# Patient Record
Sex: FEMALE
[De-identification: names, ages, dates, MRNs, and addresses within clinical notes are randomized per-mention and may not be internally consistent; named-entity substitution may affect disease eponyms.]

---

## 2017-07-06 ENCOUNTER — FORM ENCOUNTER (OUTPATIENT)
Age: 47
End: 2017-07-06

## 2018-11-18 ENCOUNTER — FORM ENCOUNTER (OUTPATIENT)
Age: 48
End: 2018-11-18

## 2019-12-22 ENCOUNTER — FORM ENCOUNTER (OUTPATIENT)
Age: 49
End: 2019-12-22

## 2020-01-27 ENCOUNTER — FORM ENCOUNTER (OUTPATIENT)
Age: 50
End: 2020-01-27

## 2020-03-12 ENCOUNTER — TRANSCRIPTION ENCOUNTER (OUTPATIENT)
Age: 50
End: 2020-03-12

## 2021-01-30 DIAGNOSIS — Z80.1 FAMILY HISTORY OF MALIGNANT NEOPLASM OF TRACHEA, BRONCHUS AND LUNG: ICD-10-CM

## 2021-01-30 DIAGNOSIS — Z80.9 FAMILY HISTORY OF MALIGNANT NEOPLASM, UNSPECIFIED: ICD-10-CM

## 2021-01-30 DIAGNOSIS — Q04.9 CONGENITAL MALFORMATION OF BRAIN, UNSPECIFIED: ICD-10-CM

## 2021-01-30 DIAGNOSIS — Z80.0 FAMILY HISTORY OF MALIGNANT NEOPLASM OF DIGESTIVE ORGANS: ICD-10-CM

## 2021-02-04 ENCOUNTER — APPOINTMENT (OUTPATIENT)
Dept: BREAST CENTER | Facility: CLINIC | Age: 51
End: 2021-02-04

## 2021-03-30 PROBLEM — Z00.00 ENCOUNTER FOR PREVENTIVE HEALTH EXAMINATION: Status: ACTIVE | Noted: 2021-01-25

## 2021-03-31 ENCOUNTER — APPOINTMENT (OUTPATIENT)
Dept: BREAST CENTER | Facility: CLINIC | Age: 51
End: 2021-03-31
Payer: COMMERCIAL

## 2021-03-31 VITALS
DIASTOLIC BLOOD PRESSURE: 72 MMHG | BODY MASS INDEX: 20.8 KG/M2 | HEIGHT: 62 IN | WEIGHT: 113 LBS | HEART RATE: 100 BPM | SYSTOLIC BLOOD PRESSURE: 117 MMHG

## 2021-03-31 DIAGNOSIS — Z00.00 ENCOUNTER FOR GENERAL ADULT MEDICAL EXAMINATION W/OUT ABNORMAL FINDINGS: ICD-10-CM

## 2021-03-31 PROCEDURE — 99213 OFFICE O/P EST LOW 20 MIN: CPT

## 2021-03-31 PROCEDURE — 99072 ADDL SUPL MATRL&STAF TM PHE: CPT

## 2021-03-31 NOTE — PHYSICAL EXAM
[de-identified] : Bilateral Breast/Axilla/Supraclavicular Area: no masses, discharge, or adenopathy

## 2021-03-31 NOTE — REVIEW OF SYSTEMS
[Negative] : Constitutional [Shortness Of Breath] : no shortness of breath [Wheezing] : no wheezing [Skin Lesions] : no skin lesions [Skin Wound] : no skin wound

## 2021-03-31 NOTE — HISTORY OF PRESENT ILLNESS
[FreeTextEntry1] : 3/31/21: Patient with significant family history, here for breast cancer screening, She denies palpable breast mass or nipple discharge. Patient had genetic testing in January 2020 and was found to be BRCA (-) with a VUS in BMPR1A.\par  7/7/2017: bilateral mammogram-VIRGIL\par  11/19/2018: bilateral mammogram and ultrasound-multiple cysts.\par  12/23/19 bear mg and us: no evidence of disease. \par 1/8/21: Bear MG & US: heterogeneously dense, scattered benign calcs bear, bear nodular asymmetry c/w cysts seen on US, R - stable tissue marker, US: R - 0.6cm benign cluster of cysts 3:00 1FN, 0.7cm cluster of cysts 4:00 1FN, 0.9cm cyst w/ debris 6:001 FN, L - 0.7cm cyst w/ debris 12:00 3FN, 0.4cm cyst w/ debris 10:00 3FN, 0.8cm cluster of cysts RA. BIRADS 2. \par \par KEREN Welch Lifetime Risk Score 22.6%

## 2021-03-31 NOTE — PAST MEDICAL HISTORY
[Menarche Age ____] : age at menarche was [unfilled] [Total Preg ___] : G[unfilled] [Live Births ___] : P[unfilled]  [Age At Live Birth ___] : Age at live birth: [unfilled] [Definite ___ (Date)] : the last menstrual period was [unfilled] [Regular Cycle Intervals] : have been regular [History of Hormone Replacement Treatment] : has no history of hormone replacement treatment [FreeTextEntry5] : polyp removal

## 2021-07-22 ENCOUNTER — APPOINTMENT (OUTPATIENT)
Dept: BREAST CENTER | Facility: CLINIC | Age: 51
End: 2021-07-22

## 2021-07-29 ENCOUNTER — APPOINTMENT (OUTPATIENT)
Dept: BREAST CENTER | Facility: CLINIC | Age: 51
End: 2021-07-29
Payer: COMMERCIAL

## 2021-07-29 VITALS
BODY MASS INDEX: 20.8 KG/M2 | WEIGHT: 113 LBS | HEART RATE: 116 BPM | HEIGHT: 62 IN | SYSTOLIC BLOOD PRESSURE: 116 MMHG | DIASTOLIC BLOOD PRESSURE: 82 MMHG

## 2021-07-29 PROCEDURE — 99213 OFFICE O/P EST LOW 20 MIN: CPT

## 2022-02-17 ENCOUNTER — APPOINTMENT (OUTPATIENT)
Dept: BREAST CENTER | Facility: CLINIC | Age: 52
End: 2022-02-17
Payer: COMMERCIAL

## 2022-02-17 ENCOUNTER — NON-APPOINTMENT (OUTPATIENT)
Age: 52
End: 2022-02-17

## 2022-02-17 VITALS
BODY MASS INDEX: 20.98 KG/M2 | WEIGHT: 114 LBS | HEART RATE: 88 BPM | HEIGHT: 62 IN | SYSTOLIC BLOOD PRESSURE: 128 MMHG | DIASTOLIC BLOOD PRESSURE: 84 MMHG

## 2022-02-17 PROCEDURE — 99213 OFFICE O/P EST LOW 20 MIN: CPT

## 2022-09-07 PROBLEM — N64.9 DISORDER OF BREAST, UNSPECIFIED: Status: ACTIVE | Noted: 2021-03-30

## 2022-09-07 PROBLEM — Z12.39 BREAST CANCER SCREENING: Status: ACTIVE | Noted: 2021-02-03

## 2022-09-07 PROBLEM — N60.09 CYST OF BREAST, UNSPECIFIED LATERALITY: Status: ACTIVE | Noted: 2021-03-30

## 2022-09-07 PROBLEM — Z13.71 BRCA NEGATIVE: Status: ACTIVE | Noted: 2021-03-31

## 2022-09-12 ENCOUNTER — APPOINTMENT (OUTPATIENT)
Dept: BREAST CENTER | Facility: CLINIC | Age: 52
End: 2022-09-12

## 2022-09-12 VITALS
DIASTOLIC BLOOD PRESSURE: 71 MMHG | WEIGHT: 122.4 LBS | HEART RATE: 93 BPM | SYSTOLIC BLOOD PRESSURE: 115 MMHG | HEIGHT: 62 IN | BODY MASS INDEX: 22.52 KG/M2

## 2022-09-12 DIAGNOSIS — Z12.39 ENCOUNTER FOR OTHER SCREENING FOR MALIGNANT NEOPLASM OF BREAST: ICD-10-CM

## 2022-09-12 DIAGNOSIS — N60.09 SOLITARY CYST OF UNSPECIFIED BREAST: ICD-10-CM

## 2022-09-12 DIAGNOSIS — Z13.71 ENCOUNTER FOR NONPROCREATIVE SCREENING FOR GENETIC DISEASE CARRIER STATUS: ICD-10-CM

## 2022-09-12 DIAGNOSIS — N64.9 DISORDER OF BREAST, UNSPECIFIED: ICD-10-CM

## 2022-09-12 PROCEDURE — 99213 OFFICE O/P EST LOW 20 MIN: CPT

## 2023-03-10 ENCOUNTER — APPOINTMENT (OUTPATIENT)
Dept: BREAST CENTER | Facility: CLINIC | Age: 53
End: 2023-03-10
Payer: COMMERCIAL

## 2023-03-10 VITALS
DIASTOLIC BLOOD PRESSURE: 78 MMHG | HEART RATE: 79 BPM | BODY MASS INDEX: 21.53 KG/M2 | HEIGHT: 62 IN | WEIGHT: 117 LBS | SYSTOLIC BLOOD PRESSURE: 112 MMHG

## 2023-03-10 DIAGNOSIS — Z78.9 OTHER SPECIFIED HEALTH STATUS: ICD-10-CM

## 2023-03-10 PROCEDURE — 99213 OFFICE O/P EST LOW 20 MIN: CPT

## 2023-03-10 NOTE — HISTORY OF PRESENT ILLNESS
[FreeTextEntry1] : Patient is a 53yo F who presents for breast cancer screening. Followed for fhx of breast cancer in maternal aunt (age 35) and paternal cousin (age 50s) Patient is BRCA negative with a VUS in BMPR1A (tested 2020). Patient denies palpable masses, skin changes, or nipple discharge bilaterally.\par \par KEREN Lifetime Risk- 22.6%\par \par 7/7/17: B/l MG- VIRGIL\par 11/19/18: B/l MG & US- multiple cysts.\par 12/23/19: B/l MG & US- no evidence of disease. \par 1/8/21: B/l MG/US- heterogeneously dense,b/l  benign calcs, tobi nodular asymmetry c/w cysts BIRADS 2. \par 7/22/21: MRI- multiple b/l scattered less than 0.5 cm foci of enhancement, multiple b/l nonenhancing masses likely cysts, BIRADS 2\par 2/17/22: B/l MG & US- heterogenously dense. B/l cysts and diffuse calcifications. VIRGIL. BI-RADS 2\par 9/12/22: MRI- stable scattered enhancing foci b/l, VIRGIL. BIRADS 2.\par 3/10/23: B/l MG & US- heterogenously dense. R bx clip. US- B/l numerous scattered cysts. BI-RADS 2

## 2023-03-10 NOTE — PAST MEDICAL HISTORY
[Menarche Age ____] : age at menarche was [unfilled] [Regular Cycle Intervals] : have been regular [Total Preg ___] : G[unfilled] [Live Births ___] : P[unfilled]  [Age At Live Birth ___] : Age at live birth: [unfilled] [Menstruating] : The patient is menstruating [Definite ___ (Date)] : the last menstrual period was [unfilled] [History of Hormone Replacement Treatment] : has no history of hormone replacement treatment [FreeTextEntry5] : polyp removal  [FreeTextEntry6] : No [FreeTextEntry7] : Yes [FreeTextEntry8] : Yes

## 2023-03-10 NOTE — PHYSICAL EXAM
[Normocephalic] : normocephalic [EOMI] : extra ocular movement intact [Supple] : supple [No Supraclavicular Adenopathy] : no supraclavicular adenopathy [No Cervical Adenopathy] : no cervical adenopathy [de-identified] : Bilateral Breast/Axilla/Supraclavicular Area: no masses, discharge, or adenopathy

## 2023-09-06 NOTE — PAST MEDICAL HISTORY
[History of Hormone Replacement Treatment] : has no history of hormone replacement treatment [FreeTextEntry5] : polyp removal  [FreeTextEntry6] : No [FreeTextEntry7] : Yes [FreeTextEntry8] : Yes

## 2023-09-06 NOTE — PHYSICAL EXAM
[de-identified] : Bilateral Breast/Axilla/Supraclavicular Area: no masses, discharge, or adenopathy

## 2023-09-06 NOTE — HISTORY OF PRESENT ILLNESS
[FreeTextEntry1] : Patient is a 51 yo F who presents for breast cancer screening. Followed for fhx of breast cancer in maternal aunt (age 35) and paternal cousin (age 50s) Patient is BRCA negative with a VUS in BMPR1A (tested 2020). Patient denies palpable masses, skin changes, or nipple discharge bilaterally.  KEREN Lifetime Risk- 22.6%  7/7/17: B/l MG- VIRGIL 11/19/18: B/l MG & US- multiple cysts. 12/23/19: B/l MG & US- no evidence of disease. 1/8/21: B/l MG/US- heterogeneously dense,b/l  benign calcs, tobi nodular asymmetry c/w cysts BIRADS 2.  7/22/21: MRI- multiple b/l scattered less than 0.5 cm foci of enhancement, multiple b/l nonenhancing masses likely cysts, BIRADS 2 2/17/22: B/l MG & US- heterogenously dense. B/l cysts and diffuse calcifications. VIRGIL. BI-RADS 2 9/12/22: MRI- stable scattered enhancing foci b/l, VIRGIL. BIRADS 2. 3/10/23: B/l MG & US- heterogenously dense. R bx clip. US- B/l numerous scattered cysts. BI-RADS 2 9/13/23: MRI- scheduled

## 2023-09-13 ENCOUNTER — NON-APPOINTMENT (OUTPATIENT)
Age: 53
End: 2023-09-13

## 2023-09-13 ENCOUNTER — APPOINTMENT (OUTPATIENT)
Dept: BREAST CENTER | Facility: CLINIC | Age: 53
End: 2023-09-13
Payer: COMMERCIAL

## 2023-09-13 VITALS
WEIGHT: 116 LBS | HEIGHT: 62 IN | SYSTOLIC BLOOD PRESSURE: 122 MMHG | HEART RATE: 73 BPM | BODY MASS INDEX: 21.35 KG/M2 | DIASTOLIC BLOOD PRESSURE: 83 MMHG

## 2023-09-13 DIAGNOSIS — Z12.39 ENCOUNTER FOR OTHER SCREENING FOR MALIGNANT NEOPLASM OF BREAST: ICD-10-CM

## 2023-09-13 DIAGNOSIS — Z80.3 FAMILY HISTORY OF MALIGNANT NEOPLASM OF BREAST: ICD-10-CM

## 2023-09-13 PROCEDURE — 99213 OFFICE O/P EST LOW 20 MIN: CPT

## 2024-03-15 ENCOUNTER — APPOINTMENT (OUTPATIENT)
Dept: BREAST CENTER | Facility: CLINIC | Age: 54
End: 2024-03-15

## 2024-04-11 ENCOUNTER — NON-APPOINTMENT (OUTPATIENT)
Age: 54
End: 2024-04-11

## 2024-04-19 ENCOUNTER — APPOINTMENT (OUTPATIENT)
Dept: BREAST CENTER | Facility: CLINIC | Age: 54
End: 2024-04-19

## 2024-09-23 ENCOUNTER — APPOINTMENT (OUTPATIENT)
Dept: BREAST CENTER | Facility: CLINIC | Age: 54
End: 2024-09-23
Payer: COMMERCIAL

## 2024-09-23 VITALS
HEART RATE: 89 BPM | SYSTOLIC BLOOD PRESSURE: 121 MMHG | DIASTOLIC BLOOD PRESSURE: 77 MMHG | BODY MASS INDEX: 21.9 KG/M2 | HEIGHT: 62 IN | WEIGHT: 119 LBS

## 2024-09-23 DIAGNOSIS — Z12.39 ENCOUNTER FOR OTHER SCREENING FOR MALIGNANT NEOPLASM OF BREAST: ICD-10-CM

## 2024-09-23 PROCEDURE — 99214 OFFICE O/P EST MOD 30 MIN: CPT

## 2024-09-23 RX ORDER — PROGESTERONE 200 MG/1
CAPSULE ORAL
Refills: 0 | Status: ACTIVE | COMMUNITY

## 2024-09-23 RX ORDER — ESTRADIOL 0.06 MG/D
PATCH TRANSDERMAL
Refills: 0 | Status: ACTIVE | COMMUNITY

## 2024-09-23 NOTE — PLAN
[TextEntry] :  Reviewed imaging findings and discussed results with patient.  Discussed the risks and benefits of exogenous HRT use. Discussed the known associated increased risk of developing breast cancer with long term use. Patient is to follow up with GYN regarding post menopausal symptoms and alternatives for symptomatic relief. She is to return in 6 months for annual B/l MG & US.  If benign, she is to return in 1 year for annual high risk MRI and office visit.

## 2024-09-23 NOTE — PAST MEDICAL HISTORY
[Menstruating] : The patient is menstruating [Menarche Age ____] : age at menarche was [unfilled] [Definite ___ (Date)] : the last menstrual period was [unfilled] [Regular Cycle Intervals] : have been regular [Total Preg ___] : G[unfilled] [Live Births ___] : P[unfilled]  [Age At Live Birth ___] : Age at live birth: [unfilled] [History of Hormone Replacement Treatment] : has no history of hormone replacement treatment [FreeTextEntry5] : polyp removal  [FreeTextEntry6] : No [FreeTextEntry7] : Yes [FreeTextEntry8] : Yes

## 2024-09-23 NOTE — PHYSICAL EXAM
[Normocephalic] : normocephalic [EOMI] : extra ocular movement intact [Supple] : supple [No Supraclavicular Adenopathy] : no supraclavicular adenopathy [No Cervical Adenopathy] : no cervical adenopathy [de-identified] : Bilateral Breast/Axilla/Supraclavicular Area: no masses, discharge, or adenopathy

## 2024-09-23 NOTE — PHYSICAL EXAM
[Normocephalic] : normocephalic [EOMI] : extra ocular movement intact [Supple] : supple [No Supraclavicular Adenopathy] : no supraclavicular adenopathy [No Cervical Adenopathy] : no cervical adenopathy [de-identified] : Bilateral Breast/Axilla/Supraclavicular Area: no masses, discharge, or adenopathy

## 2024-09-23 NOTE — HISTORY OF PRESENT ILLNESS
[FreeTextEntry1] : Patient is a 52 yo F who presents for breast cancer screening.  Of note, patient is currently taking progesterone pill and using estradiol patch since 12/23. Followed for fhx of breast cancer in maternal aunt (age 35) and paternal cousin (age 50s) Patient is BRCA negative with a VUS in BMPR1A (tested 2020). Patient denies palpable masses, skin changes, or nipple discharge bilaterally.  KEREN Lifetime Risk- 22.6%  7/7/17: B/l MG- VIRGIL 11/19/18: B/l MG & US- multiple cysts. 12/23/19: B/l MG & US- no evidence of disease. 1/8/21: B/l MG/US- heterogeneously dense,b/l  benign calcs, tobi nodular asymmetry c/w cysts BIRADS 2.  7/22/21: MRI- multiple b/l scattered less than 0.5 cm foci of enhancement, multiple b/l nonenhancing masses likely cysts, BIRADS 2 2/17/22: B/l MG & US- heterogenously dense. B/l cysts and diffuse calcifications. VIRGIL. BI-RADS 2 9/12/22: MRI- stable scattered enhancing foci b/l, VIRGIL. BIRADS 2. 3/10/23: B/l MG & US- heterogenously dense. R bx clip. US- B/l numerous scattered cysts. BI-RADS 2 9/13/23: MRI- No MR evidence of malignancy. BI-RADS 2 3/15/24 B/L MG&US- heterogeneously dense. us-  There are multiple bilateral simple cysts, clusters of cysts, and complicated cysts. B/L retroareolar ductal ectasia. BIRADS 2 9/23/24 MRI- heterogeneously dense. B/L cysts. BIRADS 2.

## 2024-09-23 NOTE — HISTORY OF PRESENT ILLNESS
[FreeTextEntry1] : Patient is a 54 yo F who presents for breast cancer screening.  Of note, patient is currently taking progesterone pill and using estradiol patch since 12/23. Followed for fhx of breast cancer in maternal aunt (age 35) and paternal cousin (age 50s) Patient is BRCA negative with a VUS in BMPR1A (tested 2020). Patient denies palpable masses, skin changes, or nipple discharge bilaterally.  KEREN Lifetime Risk- 22.6%  7/7/17: B/l MG- VIRGIL 11/19/18: B/l MG & US- multiple cysts. 12/23/19: B/l MG & US- no evidence of disease. 1/8/21: B/l MG/US- heterogeneously dense,b/l  benign calcs, tobi nodular asymmetry c/w cysts BIRADS 2.  7/22/21: MRI- multiple b/l scattered less than 0.5 cm foci of enhancement, multiple b/l nonenhancing masses likely cysts, BIRADS 2 2/17/22: B/l MG & US- heterogenously dense. B/l cysts and diffuse calcifications. VIRGIL. BI-RADS 2 9/12/22: MRI- stable scattered enhancing foci b/l, VIRGIL. BIRADS 2. 3/10/23: B/l MG & US- heterogenously dense. R bx clip. US- B/l numerous scattered cysts. BI-RADS 2 9/13/23: MRI- No MR evidence of malignancy. BI-RADS 2 3/15/24 B/L MG&US- heterogeneously dense. us-  There are multiple bilateral simple cysts, clusters of cysts, and complicated cysts. B/L retroareolar ductal ectasia. BIRADS 2 9/23/24 MRI- heterogeneously dense. B/L cysts. BIRADS 2.

## 2025-04-01 ENCOUNTER — NON-APPOINTMENT (OUTPATIENT)
Age: 55
End: 2025-04-01

## 2025-04-01 DIAGNOSIS — R92.8 OTHER ABNORMAL AND INCONCLUSIVE FINDINGS ON DIAGNOSTIC IMAGING OF BREAST: ICD-10-CM

## 2025-05-12 ENCOUNTER — RESULT REVIEW (OUTPATIENT)
Age: 55
End: 2025-05-12

## 2025-05-21 ENCOUNTER — NON-APPOINTMENT (OUTPATIENT)
Age: 55
End: 2025-05-21

## 2025-05-21 ENCOUNTER — APPOINTMENT (OUTPATIENT)
Dept: BREAST CENTER | Facility: CLINIC | Age: 55
End: 2025-05-21
Payer: COMMERCIAL

## 2025-05-21 VITALS
BODY MASS INDEX: 21 KG/M2 | WEIGHT: 120 LBS | SYSTOLIC BLOOD PRESSURE: 113 MMHG | DIASTOLIC BLOOD PRESSURE: 65 MMHG | HEIGHT: 63.43 IN | HEART RATE: 106 BPM

## 2025-05-21 DIAGNOSIS — N60.99 UNSPECIFIED BENIGN MAMMARY DYSPLASIA OF UNSPECIFIED BREAST: ICD-10-CM

## 2025-05-21 PROCEDURE — 99215 OFFICE O/P EST HI 40 MIN: CPT

## 2025-05-21 RX ORDER — MELATONIN 1.5 MG
1.5 TABLET ORAL
Refills: 0 | Status: ACTIVE | COMMUNITY

## 2025-06-10 ENCOUNTER — APPOINTMENT (OUTPATIENT)
Dept: BREAST CENTER | Facility: AMBULATORY SURGERY CENTER | Age: 55
End: 2025-06-10
Payer: COMMERCIAL

## 2025-06-10 ENCOUNTER — RESULT REVIEW (OUTPATIENT)
Age: 55
End: 2025-06-10

## 2025-06-10 PROCEDURE — 76098 X-RAY EXAM SURGICAL SPECIMEN: CPT | Mod: 26

## 2025-06-10 PROCEDURE — 19125 EXCISION BREAST LESION: CPT | Mod: LT

## 2025-06-10 PROCEDURE — 14000 TIS TRNFR TRUNK 10 SQ CM/<: CPT | Mod: LT

## 2025-06-19 ENCOUNTER — APPOINTMENT (OUTPATIENT)
Dept: HEMATOLOGY ONCOLOGY | Facility: CLINIC | Age: 55
End: 2025-06-19
Payer: COMMERCIAL

## 2025-06-19 ENCOUNTER — NON-APPOINTMENT (OUTPATIENT)
Age: 55
End: 2025-06-19

## 2025-06-19 ENCOUNTER — APPOINTMENT (OUTPATIENT)
Dept: BREAST CENTER | Facility: CLINIC | Age: 55
End: 2025-06-19
Payer: COMMERCIAL

## 2025-06-19 VITALS
HEART RATE: 104 BPM | DIASTOLIC BLOOD PRESSURE: 75 MMHG | HEIGHT: 63 IN | SYSTOLIC BLOOD PRESSURE: 127 MMHG | WEIGHT: 117 LBS | BODY MASS INDEX: 20.73 KG/M2

## 2025-06-19 PROBLEM — C50.919: Status: ACTIVE | Noted: 2025-06-19

## 2025-06-19 PROCEDURE — 99024 POSTOP FOLLOW-UP VISIT: CPT

## 2025-06-19 PROCEDURE — 99204 OFFICE O/P NEW MOD 45 MIN: CPT

## 2025-06-19 RX ORDER — ELECTROLYTES/DEXTROSE
SOLUTION, ORAL ORAL
Refills: 0 | Status: ACTIVE | COMMUNITY

## 2025-06-30 ENCOUNTER — NON-APPOINTMENT (OUTPATIENT)
Age: 55
End: 2025-06-30

## 2025-07-03 ENCOUNTER — TRANSCRIPTION ENCOUNTER (OUTPATIENT)
Age: 55
End: 2025-07-03

## 2025-07-07 ENCOUNTER — APPOINTMENT (OUTPATIENT)
Dept: RADIATION ONCOLOGY | Facility: CLINIC | Age: 55
End: 2025-07-07

## 2025-07-07 VITALS
SYSTOLIC BLOOD PRESSURE: 119 MMHG | BODY MASS INDEX: 21.34 KG/M2 | DIASTOLIC BLOOD PRESSURE: 83 MMHG | TEMPERATURE: 97.9 F | WEIGHT: 120.44 LBS | HEART RATE: 94 BPM | RESPIRATION RATE: 16 BRPM | HEIGHT: 63 IN

## 2025-07-07 PROBLEM — Z87.898 HISTORY OF TACHYCARDIA: Status: RESOLVED | Noted: 2025-07-07 | Resolved: 2025-07-07

## 2025-07-07 PROCEDURE — 99204 OFFICE O/P NEW MOD 45 MIN: CPT

## 2025-07-10 ENCOUNTER — NON-APPOINTMENT (OUTPATIENT)
Age: 55
End: 2025-07-10

## 2025-07-14 ENCOUNTER — NON-APPOINTMENT (OUTPATIENT)
Age: 55
End: 2025-07-14

## 2025-07-16 ENCOUNTER — NON-APPOINTMENT (OUTPATIENT)
Age: 55
End: 2025-07-16

## 2025-07-18 PROBLEM — R45.89: Status: ACTIVE | Noted: 2025-07-18

## 2025-07-18 PROBLEM — F41.1 ANXIETY ASSOCIATED WITH CANCER DIAGNOSIS: Status: ACTIVE | Noted: 2025-07-18

## 2025-07-18 PROBLEM — F40.9: Status: ACTIVE | Noted: 2025-07-18

## 2025-07-24 ENCOUNTER — NON-APPOINTMENT (OUTPATIENT)
Age: 55
End: 2025-07-24